# Patient Record
Sex: MALE | Race: WHITE | NOT HISPANIC OR LATINO | Employment: OTHER | URBAN - METROPOLITAN AREA
[De-identification: names, ages, dates, MRNs, and addresses within clinical notes are randomized per-mention and may not be internally consistent; named-entity substitution may affect disease eponyms.]

---

## 2017-05-22 NOTE — PATIENT DISCUSSION
(D23.11) Oth benign neoplasm skin/ right eyelid, including canthus - Assesment : Examination revealed benign neoplasm of the lids.    MEASURED 17 MM FROM PUNCTUM  Central RUL on margin- light brown 2.5mm pigmented area between lashes and meibomian gland. - Plan : MINOR SX,  BX RUL 17 MM FROM PUNCTUM

## 2017-05-26 NOTE — PATIENT DISCUSSION
(D23.11) Oth benign neoplasm skin/ right eyelid, including canthus - Assesment : Examination revealed benign neoplasm of the lids.    MEASURED 17 MM FROM [facility]  Central RUL on margin- light brown 2.5mm pigmented area between lashes and meibomian gland. - Plan : MINOR SX,  BX RUL 17 MM FROM PUNCTUM  EMYCIN RICHARD RUL 1-2 X DAY FOR 7 DAYS THEN STOP  1 WEEK F/U-

## 2018-05-22 NOTE — PATIENT DISCUSSION
(H43.811) Vitreous degeneration, right eye - Assesment : Examination revealed PVD. Diagnoses discussed in depth with patient. Handout given to patient No hemorrhages,edema,holes,tears,or detachments seen today - Plan : Monitor for changes. Advised patient to call our office with decreased vision or an increase in flashes and/or floaters.  Rtc 4-6 weeks dilation OD only

## 2021-03-15 ENCOUNTER — NEW PATIENT COMPREHENSIVE (OUTPATIENT)
Dept: URBAN - METROPOLITAN AREA CLINIC 43 | Facility: CLINIC | Age: 66
End: 2021-03-15

## 2021-03-15 DIAGNOSIS — H04.123: ICD-10-CM

## 2021-03-15 DIAGNOSIS — H40.023: ICD-10-CM

## 2021-03-15 DIAGNOSIS — H25.9: ICD-10-CM

## 2021-03-15 PROCEDURE — 92004 COMPRE OPH EXAM NEW PT 1/>: CPT

## 2021-03-15 PROCEDURE — 92015 DETERMINE REFRACTIVE STATE: CPT

## 2021-03-15 ASSESSMENT — VISUAL ACUITY
OS_SC: J8-
OS_CC: J1
OD_SC: J10
OS_SC: 20/30-1
OD_SC: 20/30-2
OD_CC: J2

## 2021-03-15 ASSESSMENT — TONOMETRY
OS_IOP_MMHG: 16
OD_IOP_MMHG: 15